# Patient Record
Sex: FEMALE | Race: BLACK OR AFRICAN AMERICAN | NOT HISPANIC OR LATINO | Employment: UNEMPLOYED | URBAN - METROPOLITAN AREA
[De-identification: names, ages, dates, MRNs, and addresses within clinical notes are randomized per-mention and may not be internally consistent; named-entity substitution may affect disease eponyms.]

---

## 2017-03-09 ENCOUNTER — NURSE TRIAGE (OUTPATIENT)
Dept: ADMINISTRATIVE | Facility: CLINIC | Age: 67
End: 2017-03-09

## 2017-03-10 NOTE — TELEPHONE ENCOUNTER
Reason for Disposition   All other urine symptoms   Age > 50 years    Protocols used: ST URINARY SYMPTOMS-A-AH, ST URINATION PAIN - FEMALE-A-AH